# Patient Record
(demographics unavailable — no encounter records)

---

## 2024-10-19 NOTE — PHYSICAL EXAM
[No Acute Distress] : no acute distress [Well Nourished] : well nourished [Well Developed] : well developed [Well-Appearing] : well-appearing [Normal Voice/Communication] : normal voice/communication [Normal Sclera/Conjunctiva] : normal sclera/conjunctiva [PERRL] : pupils equal round and reactive to light [EOMI] : extraocular movements intact [Normal Outer Ear/Nose] : the outer ears and nose were normal in appearance [Normal Oropharynx] : the oropharynx was normal [No JVD] : no jugular venous distention [Supple] : supple [No Respiratory Distress] : no respiratory distress  [No Accessory Muscle Use] : no accessory muscle use [Normal Rate] : normal rate  [Regular Rhythm] : with a regular rhythm [Normal S1, S2] : normal S1 and S2 [No Edema] : there was no peripheral edema [No Extremity Clubbing/Cyanosis] : no extremity clubbing/cyanosis [Declined Breast Exam] : declined breast exam  [Declined Rectal Exam] : declined rectal exam [No CVA Tenderness] : no CVA  tenderness [No Spinal Tenderness] : no spinal tenderness [No Rash] : no rash [No Focal Deficits] : no focal deficits [Normal Gait] : normal gait [Speech Grossly Normal] : speech grossly normal [Normal Affect] : the affect was normal [Alert and Oriented x3] : oriented to person, place, and time [de-identified] : Wearing glasses [de-identified] : No sinus tenderness; diminished hearing [de-identified] : No stridor [de-identified] : RR = 16; normal air entry without wheezing [de-identified] : Normal bilateral radial pulses; no cords [de-identified] : As per GYN

## 2024-10-19 NOTE — HEALTH RISK ASSESSMENT
[No] : In the past 12 months have you used drugs other than those required for medical reasons? No [No falls in past year] : Patient reported no falls in the past year [Patient refused screening] : Patient refused screening [Reviewed no changes] : Reviewed, no changes [Former] : Former [de-identified] : Oncology, endocrinology, podiatry [de-identified] : Exercises [de-identified] : Regular [AdvancecareDate] : 10/24

## 2024-10-19 NOTE — REVIEW OF SYSTEMS
[Vision Problems] : vision problems [Hearing Loss] : hearing loss [Nocturia] : nocturia [Frequency] : frequency [Joint Pain] : joint pain [Muscle Pain] : muscle pain [Back Pain] : back pain [Headache] : headache [Dizziness] : dizziness [Depression] : depression

## 2024-10-19 NOTE — HISTORY OF PRESENT ILLNESS
[Family Member] : family member [FreeTextEntry1] : Comes in for follow-up medical visit. Accompanied by her son David. [de-identified] : She reports that her lower extremity swelling has diminished.  She continues to have lower extremity cramping and pain. She reports normal urination and bowel movements.  She denies any chest pain or abdominal pain.  She denies any palpitations, diaphoresis, nausea/vomiting, shortness of breath.  She denies any cough or hemoptysis. She recently had oncology follow-up for her breast cancer. She also recently saw endocrinology.  She recently saw podiatry.  She is awaiting diabetic shoes. She will be seeing head and neck surgery regarding her hyperparathyroidism.  She also has an upcoming nephrology appointment.

## 2024-10-19 NOTE — ASSESSMENT
[FreeTextEntry1] : Diabetes Likely with neuropathy Likely with nephropathy A1c level of 6%  Saw endocrine Needs diabetes education with home glucose monitoring Needs nutritional counseling Saw podiatry for diabetic footcare = awaiting specialized shoes Will likely need neurology evaluation given progressive neuropathy To see ophthalmology for diabetic eye exam Exercise/weight control ADA diet  Evidence of CKD Likely related to DM and hypertension Planning nephrology evaluation Avoid nephrotoxins especially NSAIDs Keep well-hydrated Will closely monitor creatinine and GFR  History of nonalcoholic fatty liver disease Monitor liver ultrasound and FibroScan Monitor LFTs Plans hepatology evaluation = referrals given Weight control Low-fat diet Control lipids  High cholesterol Lipid profile not at goal Exercise/weight control Strict low-fat diet reviewed with patient Continue daily atorvastatin = dose increased by endocrine Monitor lipid profile  Hypercalcemia with elevated PTH level = hyperparathyroidism Keep well-hydrated Closely monitor calcium level No calcium supplementation Just had abnormal bone density consistent with osteoporosis Saw endocrine and referred to head and neck surgery for further management  Hypertension Blood pressure in better range Continue losartan Continue amlodipine Home blood pressure monitoring Low-sodium diet Exercise/weight control Will need cardiology evaluation  Needed health maintenance reviewed Spoke with Mia via phone = concerned that patient's leg pain and cramping may be due to anastrozole = she will reach out to oncology She will return in follow-up in 6-12 weeks and as needed She will call if her status changes or worsens or for any medical issues and return to be seen immediately All of the above was discussed in detail with them and all questions were answered They verbally confirmed understanding of all of the above and agreement with the above plan

## 2024-10-19 NOTE — HEALTH RISK ASSESSMENT
[No] : In the past 12 months have you used drugs other than those required for medical reasons? No [No falls in past year] : Patient reported no falls in the past year [Patient refused screening] : Patient refused screening [Reviewed no changes] : Reviewed, no changes [Former] : Former [de-identified] : Oncology, endocrinology, podiatry [de-identified] : Exercises [de-identified] : Regular [AdvancecareDate] : 10/24

## 2024-10-19 NOTE — HISTORY OF PRESENT ILLNESS
[Family Member] : family member [FreeTextEntry1] : Comes in for follow-up medical visit. Accompanied by her son David. [de-identified] : She reports that her lower extremity swelling has diminished.  She continues to have lower extremity cramping and pain. She reports normal urination and bowel movements.  She denies any chest pain or abdominal pain.  She denies any palpitations, diaphoresis, nausea/vomiting, shortness of breath.  She denies any cough or hemoptysis. She recently had oncology follow-up for her breast cancer. She also recently saw endocrinology.  She recently saw podiatry.  She is awaiting diabetic shoes. She will be seeing head and neck surgery regarding her hyperparathyroidism.  She also has an upcoming nephrology appointment.

## 2024-10-19 NOTE — PHYSICAL EXAM
[No Acute Distress] : no acute distress [Well Nourished] : well nourished [Well Developed] : well developed [Well-Appearing] : well-appearing [Normal Voice/Communication] : normal voice/communication [Normal Sclera/Conjunctiva] : normal sclera/conjunctiva [PERRL] : pupils equal round and reactive to light [EOMI] : extraocular movements intact [Normal Outer Ear/Nose] : the outer ears and nose were normal in appearance [Normal Oropharynx] : the oropharynx was normal [No JVD] : no jugular venous distention [Supple] : supple [No Respiratory Distress] : no respiratory distress  [No Accessory Muscle Use] : no accessory muscle use [Normal Rate] : normal rate  [Regular Rhythm] : with a regular rhythm [Normal S1, S2] : normal S1 and S2 [No Edema] : there was no peripheral edema [No Extremity Clubbing/Cyanosis] : no extremity clubbing/cyanosis [Declined Breast Exam] : declined breast exam  [Declined Rectal Exam] : declined rectal exam [No CVA Tenderness] : no CVA  tenderness [No Spinal Tenderness] : no spinal tenderness [No Rash] : no rash [No Focal Deficits] : no focal deficits [Normal Gait] : normal gait [Speech Grossly Normal] : speech grossly normal [Normal Affect] : the affect was normal [Alert and Oriented x3] : oriented to person, place, and time [de-identified] : Wearing glasses [de-identified] : No sinus tenderness; diminished hearing [de-identified] : No stridor [de-identified] : RR = 16; normal air entry without wheezing [de-identified] : Normal bilateral radial pulses; no cords [de-identified] : As per GYN

## 2024-10-25 NOTE — PHYSICAL EXAM
[de-identified] : no palpable thyroid nodules [Laryngoscopy Performed] : laryngoscopy was performed, see procedure section for findings [Midline] : located in midline position [Normal] : orientation to person, place, and time: normal [de-identified] : indirect  laryngoscopy shows normal vocal cord mobility bilaterally with no lesions noted

## 2024-10-25 NOTE — CONSULT LETTER
[Dear  ___] : Dear  [unfilled], [Consult Letter:] : I had the pleasure of evaluating your patient, [unfilled]. [Please see my note below.] : Please see my note below. [Consult Closing:] : Thank you very much for allowing me to participate in the care of this patient.  If you have any questions, please do not hesitate to contact me. [Sincerely,] : Sincerely, [FreeTextEntry2] : Dr. Kimber Campo, Dr. Kimberly Oglesby, Dr. Kristi Medina [FreeTextEntry3] : Fabian Harley MD, FACS System Director, Endocrine Surgery [facility] Associate  Professor of Surgery [facility] [employer] at [facility] [DrDarcy  ___] : Dr. RODRIGUEZ [DrDarcy ___] : Dr. RODRIGUEZ

## 2024-10-25 NOTE — HISTORY OF PRESENT ILLNESS
[de-identified] : Pt c/ elevated calcium for several years, bone pain, fatigue and kidney stones. denies dysphagia, hoarseness, SOB or RT exposure.  Ca 10.7, , Vitamin D 44.4, 24 Hr Urine Ca 43, TSH 1.72 sonogram: Left 2.8 cm possible parathyroid, bilateral subcentimeter thyroid nodules bone density: osteoporosis (prior showed osteopenia) I have reviewed all old and new data and available images.  Additional information was obtained from others present at the time of visit to ensure the completeness of the history

## 2024-10-25 NOTE — PHYSICAL EXAM
[de-identified] : no palpable thyroid nodules [Laryngoscopy Performed] : laryngoscopy was performed, see procedure section for findings [Midline] : located in midline position [Normal] : orientation to person, place, and time: normal [de-identified] : indirect  laryngoscopy shows normal vocal cord mobility bilaterally with no lesions noted

## 2024-10-25 NOTE — CONSULT LETTER
[Dear  ___] : Dear  [unfilled], [Consult Letter:] : I had the pleasure of evaluating your patient, [unfilled]. [Please see my note below.] : Please see my note below. [Consult Closing:] : Thank you very much for allowing me to participate in the care of this patient.  If you have any questions, please do not hesitate to contact me. [Sincerely,] : Sincerely, [FreeTextEntry2] : Dr. Kimber Campo, Dr. Kimberly Oglesby, Dr. Kristi Medina [FreeTextEntry3] : Fabian Harley MD, FACS System Director, Endocrine Surgery [facility] Associate  Professor of Surgery [facility] [employer] at [facility] [DrDarcy  ___] : Dr. RODRIGUEZ [DrDarcy ___] : Dr. RODRIGUEZ

## 2024-10-25 NOTE — HISTORY OF PRESENT ILLNESS
[de-identified] : Pt c/ elevated calcium for several years, bone pain, fatigue and kidney stones. denies dysphagia, hoarseness, SOB or RT exposure.  Ca 10.7, , Vitamin D 44.4, 24 Hr Urine Ca 43, TSH 1.72 sonogram: Left 2.8 cm possible parathyroid, bilateral subcentimeter thyroid nodules bone density: osteoporosis (prior showed osteopenia) I have reviewed all old and new data and available images.  Additional information was obtained from others present at the time of visit to ensure the completeness of the history

## 2024-10-25 NOTE — ASSESSMENT
[FreeTextEntry1] : lengthy discussion regarding options for management. in view of labs and symptoms have recommended minimally invasive parathyroidectomy with PTH assay.  will require preop 4D MRI (no CT due to elevate creatinine).  risks, benefits and alternatives discussed at length.  I have discussed with the patient the anatomy of the area, the pathophysiology of the disease process and the rationale for surgery.  The attendant risks, possible complications and expected postoperative course have been discussed in detail.  I have given the patient the opportunity to ask questions, and all questions have been answered to the patient's satisfaction, and they wish to proceed with the planned procedure.  to be scheduled ambulatory at [facility]. to call next week for test results.

## 2024-10-25 NOTE — ASSESSMENT
[FreeTextEntry1] : lengthy discussion regarding options for management. in view of labs and symptoms have recommended minimally invasive parathyroidectomy with PTH assay.  will require preop 4D MRI (no CT due to elevate creatinine).  risks, benefits and alternatives discussed at length.  I have discussed with the patient the anatomy of the area, the pathophysiology of the disease process and the rationale for surgery.  The attendant risks, possible complications and expected postoperative course have been discussed in detail.  I have given the patient the opportunity to ask questions, and all questions have been answered to the patient's satisfaction, and they wish to proceed with the planned procedure.  to be scheduled ambulatory at [facility]. to call next week for test results.

## 2024-10-29 NOTE — HISTORY OF PRESENT ILLNESS
[FreeTextEntry1] : Ms. Sharpe is  a 68 y/o F with Stage I right breast invasive ductal carcinoma, DM, HTN and primary hyperparathyroidism here for evaluation and management of her kidney disease   Diagnosed with stage I right breast invasive ductal carcinoma (T1N0) that is ER positive and Her2 negative s/p lumpectomy and SLNB. She had RT with Dr Go and started on anastrozole 1/2020 to 3/2024 and currently has been on exemestane.   H/O DM: many tears. Well controlled +NEUROPATHY, ?retinopathy  HTN: Many years Occasionally measures BP at home: usually in the 150-160 millimeters mercury Recently started on amlodipine  Hyperparathyroidism:  untreated for many years +osteoporosis, + kidney stones Had a 24 hr urine collection: calcium was not elevated Follows with an endocrinologist , now scheduled for surgery in mid November  Records indicate that her serum creatinine in 2021 and 2022 was ranging between 1.2 to 1.3 mg/dL.  No blood work is available in 2023 this year noted to have a creatinine of 1.6 mg/dL when checked last month.  Also noted to have elevated serum calcium ranging between 10.5 -11 mg/dl  over the last couple of years She complains of body weeks and does take Motrin on an as-needed basis which is very rare probably once a month or so

## 2024-10-29 NOTE — ASSESSMENT
[FreeTextEntry1] : Ms. Sharpe is a 69-year-old woman with history of diabetes, hypertension, newly diagnosed hyperparathyroidism here for evaluation and management of her chronic kidney disease  CKD: stage 3. Records indicate that her serum creatinine in 2021 and 2022 was ranging between 1.2 to 1.3 mg/dL.  No blood work is available in 2023 this year noted to have a creatinine of 1.6 mg/dL when checked last month.  This is likely in the setting of longstanding history of diabetes and hypertension.  Her kidney function has worsened over the last year or so possibly the hyperparathyroidism with hypercalcemia may also be playing a role here. She does have evidence of neuropathy and microalbuminuria.  Unclear if she has retinopathy or not Currently her sugar is under control. Reinforced adequate hydration Her use of NSAIDs is very sparse. At some point will start SGLT2 inhibitors however currently her blood pressure needs to be better controlled  HTN: Her blood pressure is uncontrolled, we discussed low-salt diet Maintain on losartan.  Will start Karedia Keep amlodipine for now  Hyperparathyroidism: History of kidney stones as well as osteoporosis and hypercalcemia per daughter-in-law this is longstanding.  Her 24-hour urine collection is not high which is possibly the only parameter not consistent with hyperparathyroidism.  It is quite possible this was an undercollection.  She is scheduled for an MRI of parathyroid.  Further management per endocrine and ENT surgeon  F/U in 3 months  Total Time Spent today on encounter 45 minutes. >50% time spent in counseling and coordination of care and on addressing above medical conditions in assessment. All labs, imaging, consulting reports, and any relevant outside records including laboratory work personally reviewed in order to evaluate, manage, and coordinate care amongst providers

## 2024-10-29 NOTE — REVIEW OF SYSTEMS
[Feeling Tired] : feeling tired [Eyesight Problems] : eyesight problems [Chest Pain] : chest pain [Lower Ext Edema] : lower extremity edema [Arthralgias] : arthralgias [Joint Stiffness] : joint stiffness [Difficulty Walking] : difficulty walking [Sleep Disturbances] : sleep disturbances [Anxiety] : anxiety [Depression] : depression [Feelings Of Weakness] : feelings of weakness [Negative] : Integumentary [Fever] : no fever [Chills] : no chills [Recent Weight Gain (___ Lbs)] : no recent weight gain [Recent Weight Loss (___ Lbs)] : no recent weight loss [Leg Claudication] : no intermittent leg claudication [Dysuria] : no dysuria [Incontinence] : no incontinence [Pelvic Pain] : no pelvic pain [Dysmenorrhea] : no dysmenorrhea [Limb Pain] : no limb pain [Limb Swelling] : no limb swelling [Skin Wound] : no skin wound [Itching] : no itching [Change In A Mole] : no change in a mole [Dizziness] : no dizziness [Fainting] : no fainting [Limb Weakness] : no limb weakness [Easy Bleeding] : no tendency for easy bleeding [Easy Bruising] : no tendency for easy bruising [FreeTextEntry3] : wears glasses [FreeTextEntry5] : +rt chest wall pain due to rib resection [FreeTextEntry8] : +nocturia

## 2024-10-29 NOTE — PHYSICAL EXAM
[General Appearance - Alert] : alert [General Appearance - In No Acute Distress] : in no acute distress [Sclera] : the sclera and conjunctiva were normal [PERRL With Normal Accommodation] : pupils were equal in size, round, and reactive to light [FreeTextEntry1] : obese [Outer Ear] : the ears and nose were normal in appearance [Examination Of The Oral Cavity] : the lips and gums were normal [Oropharynx] : the oropharynx was normal [Neck Appearance] : the appearance of the neck was normal [Jugular Venous Distention Increased] : there was no jugular-venous distention [Respiration, Rhythm And Depth] : normal respiratory rhythm and effort [Exaggerated Use Of Accessory Muscles For Inspiration] : no accessory muscle use [Auscultation Breath Sounds / Voice Sounds] : lungs were clear to auscultation bilaterally [Heart Sounds] : normal S1 and S2 [Murmurs] : no murmurs [Heart Sounds Pericardial Friction Rub] : no pericardial rub [Edema] : there was no peripheral edema [Bowel Sounds] : normal bowel sounds [Abdomen Soft] : soft [Abdomen Tenderness] : non-tender [Abdomen Hernia] : no hernia was discovered [No CVA Tenderness] : no ~M costovertebral angle tenderness [Involuntary Movements] : no involuntary movements were seen [] : no rash [No Focal Deficits] : no focal deficits [Oriented To Time, Place, And Person] : oriented to person, place, and time [Impaired Insight] : insight and judgment were intact [Affect] : the affect was normal

## 2024-10-30 NOTE — PHYSICAL EXAM
[No Acute Distress] : no acute distress [Well Nourished] : well nourished [Well Developed] : well developed [Well-Appearing] : well-appearing [Normal Voice/Communication] : normal voice/communication [Normal Sclera/Conjunctiva] : normal sclera/conjunctiva [PERRL] : pupils equal round and reactive to light [EOMI] : extraocular movements intact [Normal Outer Ear/Nose] : the outer ears and nose were normal in appearance [Normal Oropharynx] : the oropharynx was normal [No JVD] : no jugular venous distention [Supple] : supple [No Respiratory Distress] : no respiratory distress  [No Accessory Muscle Use] : no accessory muscle use [Clear to Auscultation] : lungs were clear to auscultation bilaterally [Normal Rate] : normal rate  [Regular Rhythm] : with a regular rhythm [Normal S1, S2] : normal S1 and S2 [No Edema] : there was no peripheral edema [No Extremity Clubbing/Cyanosis] : no extremity clubbing/cyanosis [Declined Breast Exam] : declined breast exam  [Soft] : abdomen soft [Normal Bowel Sounds] : normal bowel sounds [Declined Rectal Exam] : declined rectal exam [No CVA Tenderness] : no CVA  tenderness [No Spinal Tenderness] : no spinal tenderness [No Rash] : no rash [No Focal Deficits] : no focal deficits [Normal Gait] : normal gait [Speech Grossly Normal] : speech grossly normal [Normal Affect] : the affect was normal [Alert and Oriented x3] : oriented to person, place, and time [de-identified] : No sinus tenderness; diminished hearing [de-identified] : No stridor [de-identified] : RR = 16; normal air entry without wheezing [de-identified] : Normal bilateral radial pulses; no cords [de-identified] : As per GYN

## 2024-10-30 NOTE — HISTORY OF PRESENT ILLNESS
[Chronic Kidney Disease] : chronic kidney disease [Diabetes] : diabetes [No Pertinent Cardiac History] : no history of aortic stenosis, atrial fibrillation, coronary artery disease, recent myocardial infarction, or implantable device/pacemaker [Aortic Stenosis] : no aortic stenosis [Atrial Fibrillation] : no atrial fibrillation [Coronary Artery Disease] : no coronary artery disease [Recent Myocardial Infarction] : no recent myocardial infarction [Implantable Device/Pacemaker] : no implantable device/pacemaker [Asthma] : asthma [COPD] : no COPD [Sleep Apnea] : no sleep apnea [Smoker] : not a smoker [No Adverse Anesthesia Reaction] : no adverse anesthesia reaction in self or family member [Self] : no previous adverse anesthesia reaction [Chronic Anticoagulation] : no chronic anticoagulation [(Patient denies any chest pain, claudication, dyspnea on exertion, orthopnea, palpitations or syncope)] : Patient denies any chest pain, claudication, dyspnea on exertion, orthopnea, palpitations or syncope [Unable to assess] : unable to assess [Anti-Platelet Agents: _____] : Anti-Platelet Agents: [unfilled] [FreeTextEntry1] : parathyroid surgery [FreeTextEntry2] : 11/15/24 [FreeTextEntry3] : Dr. Harley [FreeTextEntry4] : Comes in for medical clearance for upcoming planned parathyroid surgery. [FreeTextEntry7] : EKG [Family Member] : family member

## 2024-10-30 NOTE — ASSESSMENT
[Patient Optimized for Surgery] : Patient optimized for surgery [Modify anti-platelet treatment prior to procedure] : Modify anti-platelet treatment prior to procedure [Continue medications as is] : Continue current medications [As per surgery] : as per surgery [FreeTextEntry4] : Medically cleared for planned parathyroid surgery. [FreeTextEntry6] : Hold aspirin 1 week prior to surgery

## 2024-10-30 NOTE — PHYSICAL EXAM
[No Acute Distress] : no acute distress [Well Nourished] : well nourished [Well Developed] : well developed [Well-Appearing] : well-appearing [Normal Voice/Communication] : normal voice/communication [Normal Sclera/Conjunctiva] : normal sclera/conjunctiva [PERRL] : pupils equal round and reactive to light [EOMI] : extraocular movements intact [Normal Outer Ear/Nose] : the outer ears and nose were normal in appearance [Normal Oropharynx] : the oropharynx was normal [No JVD] : no jugular venous distention [Supple] : supple [No Respiratory Distress] : no respiratory distress  [No Accessory Muscle Use] : no accessory muscle use [Clear to Auscultation] : lungs were clear to auscultation bilaterally [Normal Rate] : normal rate  [Regular Rhythm] : with a regular rhythm [Normal S1, S2] : normal S1 and S2 [No Edema] : there was no peripheral edema [No Extremity Clubbing/Cyanosis] : no extremity clubbing/cyanosis [Declined Breast Exam] : declined breast exam  [Soft] : abdomen soft [Normal Bowel Sounds] : normal bowel sounds [Declined Rectal Exam] : declined rectal exam [No CVA Tenderness] : no CVA  tenderness [No Spinal Tenderness] : no spinal tenderness [No Rash] : no rash [No Focal Deficits] : no focal deficits [Normal Gait] : normal gait [Speech Grossly Normal] : speech grossly normal [Normal Affect] : the affect was normal [Alert and Oriented x3] : oriented to person, place, and time [de-identified] : No sinus tenderness; diminished hearing [de-identified] : No stridor [de-identified] : RR = 16; normal air entry without wheezing [de-identified] : Normal bilateral radial pulses; no cords [de-identified] : As per GYN

## 2024-11-26 NOTE — PHYSICAL EXAM
[de-identified] : well healed scar [Midline] : located in midline position [Normal] : orientation to person, place, and time: normal [de-identified] : Neg Chvosteks' sign

## 2024-11-26 NOTE — ASSESSMENT
[FreeTextEntry1] : s/p parathyroidectomy parotid nodule path discussed labs in office Parotid FNA requested f/u 3 months

## 2024-11-26 NOTE — HISTORY OF PRESENT ILLNESS
[de-identified] : Pt 2 weeks s/p parathyroidectomy doing well without complaints on preop scan parotid nodules found will request FNA

## 2024-12-16 NOTE — REVIEW OF SYSTEMS
[Vision Problems] : vision problems [Hearing Loss] : hearing loss [Nocturia] : nocturia [Frequency] : frequency [Joint Pain] : joint pain [Back Pain] : back pain [Headache] : headache [Dizziness] : dizziness [Depression] : depression

## 2024-12-16 NOTE — PHYSICAL EXAM
[No Acute Distress] : no acute distress [Well Nourished] : well nourished [Well Developed] : well developed [Well-Appearing] : well-appearing [Normal Voice/Communication] : normal voice/communication [Normal Sclera/Conjunctiva] : normal sclera/conjunctiva [PERRL] : pupils equal round and reactive to light [EOMI] : extraocular movements intact [Normal Outer Ear/Nose] : the outer ears and nose were normal in appearance [Normal Oropharynx] : the oropharynx was normal [No JVD] : no jugular venous distention [Supple] : supple [No Respiratory Distress] : no respiratory distress  [No Accessory Muscle Use] : no accessory muscle use [Clear to Auscultation] : lungs were clear to auscultation bilaterally [Normal Rate] : normal rate  [Regular Rhythm] : with a regular rhythm [Normal S1, S2] : normal S1 and S2 [No Edema] : there was no peripheral edema [No Extremity Clubbing/Cyanosis] : no extremity clubbing/cyanosis [Declined Breast Exam] : declined breast exam  [Soft] : abdomen soft [Normal Bowel Sounds] : normal bowel sounds [Declined Rectal Exam] : declined rectal exam [No CVA Tenderness] : no CVA  tenderness [No Spinal Tenderness] : no spinal tenderness [No Rash] : no rash [No Focal Deficits] : no focal deficits [Normal Gait] : normal gait [Speech Grossly Normal] : speech grossly normal [Normal Affect] : the affect was normal [Alert and Oriented x3] : oriented to person, place, and time [de-identified] : No sinus tenderness; diminished hearing [de-identified] : No stridor [de-identified] : RR = 16; normal air entry without wheezing [de-identified] : Normal bilateral radial pulses; no cords [de-identified] : As per GYN

## 2024-12-16 NOTE — HEALTH RISK ASSESSMENT
[Intercurrent hospitalizations] : was admitted to the hospital  [No] : In the past 12 months have you used drugs other than those required for medical reasons? No [No falls in past year] : Patient reported no falls in the past year [Patient refused screening] : Patient refused screening [de-identified] : Head and neck surgery [de-identified] : None [de-identified] : Regular [Reviewed no changes] : Reviewed, no changes [AdvancecareDate] : 12/24 [Former] : Former

## 2024-12-16 NOTE — HISTORY OF PRESENT ILLNESS
[FreeTextEntry1] : Comes in for routine follow-up medical visit. [de-identified] : Status post parathyroidectomy.  Had biopsy of left parotid lesion. Leg cramping has improved.  Edema has resolved.  Denies any orthopnea or PND or calf pain.  Reports normal BMs and urination.  Still complains of nocturia.  Denies any abdominal pain or nausea/vomiting/diarrhea.  Status post colonoscopy.  Denies any chest pain, palpitations, diaphoresis, or shortness of breath.  Denies any cough or hemoptysis.  Denies any fevers or chills.  Overall feeling improved.

## 2024-12-16 NOTE — ASSESSMENT
[FreeTextEntry1] : Diabetes Likely with neuropathy Likely with nephropathy Last A1c level = 6%  Saw endocrine Needs diabetes education with home glucose monitoring Needs nutritional counseling Saw podiatry for diabetic footcare = awaiting specialized shoes Will likely need neurology evaluation given progressive neuropathy To see ophthalmology for diabetic eye exam Exercise/weight control ADA diet  Evidence of CKD Likely related to DM and hypertension Under the care of nephrology Avoid nephrotoxins especially NSAIDs Keep well-hydrated Will closely monitor creatinine and GFR  History of nonalcoholic fatty liver disease Monitor liver ultrasound and FibroScan Monitor LFTs Plans hepatology evaluation = referrals given Weight control Low-fat diet Control lipids  High cholesterol Lipid profile not at goal Exercise/weight control Strict low-fat diet reviewed with patient Continue daily atorvastatin = dose increased by endocrine Monitor lipid profile  Hypercalcemia with elevated PTH level = hyperparathyroidism///osteoporosis Status post surgery Closely monitor calcium level Follow-up with Dr. Harley Follow-up with endocrine  Hypertension Blood pressure in better range Continue present regimen as per nephrology Home blood pressure monitoring Low-sodium diet Exercise/weight control Cardiology follow-up  Leg cramping Improved with change of anastrozole to exemestane Oncology follow-up  Needed health maintenance reviewed Can follow through with urology regarding urinary frequency and nocturia She will return in follow-up in 12 weeks and as needed She will call if her status changes or worsens or for any medical issues and return to be seen immediately All of the above was discussed in detail with the patient and her son and all questions were answered They verbally confirmed understanding of all of the above and agreement with the above plan

## 2025-01-09 NOTE — HISTORY OF PRESENT ILLNESS
[FreeTextEntry1] : 69 yr old female with past medical history remarkable for CKD stage 3, asthma, HTN, MASLD, breast cancer on anastrozole presenting for follow up of type 2 diabetes mellitus, primary hyperparathyroidism, and osteoporosis.  PSH: Rib resection surgery many years ago in 2014, left superior parathyroidectomy 11/2024  Regarding hx of breast cancer: - Hx of right invasive duct carcinoma ER - Underwent bilateral lumpectomies 12/2019 and completed adjuvant radiation therapy 4/2020. On anastrozole 1/2020 and then switched to exemestane due to neuropathy in 2/2024  Interval hx: Patient underwent left parathyroidectomy on 11/8/24; she had met criteria for surgery on basis of osteoporosis, renal calculi on renal US, and GFR<60 Pathology showed hypercellular parathyroid- features compatible with adenoma She underwent FNA of parotid nodule, showing simple salivary cyst Recent postoperative labs:  12/16/24 PTH 83 and Ca 9.6 11/26/24 PTH 81 and Ca 9.1  Now after surgery, symptom screening: - She is feeling much better overall - Denies stomach aches, denies constipation - Admits to mild fatigue - She is still urinating 2-3 times overnight No hx of nephrolithiasis (however renal calculi noted on US) and no hx of fracture +Hx of CKD stage 3 and following with nephrology  Osteoporosis: - BMD from 9/4/24: Spine T score -0.7, Femoral Neck T score -1.6, Total hip T score -0.2, Radius 1/3 T score -2.5 - Family hx of hypercalcemia or osteoporosis: denies - Ca through diet: eats green leafy vegetables through diet, drinks milk  - Vitamin D supplement: not taking - Exercise: not exercising much, has fear of falling  Regarding type 2 diabetes history: Diagnosed: ~2021 A1c 6% (12/2024) A1c 6% (9/2024) Current regimen: no meds, diet controlled Glycemic monitoring: not checking, advised her to obtain glucometer (had sent at last visit) and check 1-2 times per week Hypoglycemia symptoms/events: denies  Diet: healthy balanced- likes fruits, yogurt Drinks water majority of the day Avoids juice/soda  Family history of DM in father's side Denies personal hx of pancreatitis or family hx of medullary thyroid cancer/MEN  Diabetes complications and comorbidities: Retinopathy- denies, last ophthalmology exam last year, due to see Neuropathy- a lot of cramps in the evening, pins/needles sensation, following with podiatry Nephropathy- yes Last GFR: 41 on 12/16/24 Last Urine microalbumin: elevated UACr of 135 mg/g on 12/16/24, on losartan 100 mg daily and kerendia (started by nephrology)  HTN- amlodipine 5 mg and losartan 100 mg daily Coronary artery disease- denies CVA- denies Dyslipidemia- on atorvastatin 40 mg daily Last lipid panel from 12-16-24: , chol 153, HDL 41, LDL 66, non-  Social: Tobacco use- denies ETOH- denies

## 2025-01-09 NOTE — DATA REVIEWED
[FreeTextEntry1] : Hx of elevated Calcium levels noted dating back to labs from 12/2021 Ca 11.2 in 12/2021 Ca 10.7 with albumin 4.1, corrected Ca 10.6  and  on 9/5/24 25-OH vit D level of 44.4 on 9/5/24 and 1,25 OH vit D level of 58.1- wnl on 9/5/24 TSH 1.72- 9/2024 8/2024 24 hr urine Ca collection 24 hr Urine Cr 505 mg/24 hr (-1800 mg/24 hr) Urine Cr 101 mg/dL Serum Cr 1.61 mg/dL Serum Ca 10.4 mg/dL Urine Ca 8.5 mg/dL 24 hr Urine Ca 43 mg/24 hr Urine Ca to Cr ratio: 0.013  Renal US 10-10-24: IMPRESSION: Multiple right renal calculi measuring to 7 mm without associated hydronephrosis. Subcentimeter left renal cyst.  Thyroid US 10-10-24: FINDINGS: Right Lobe: 5.2 cm x 1.8 cm x 1.3 cm. Several subcentimeter nodules without suspicious features, largest upper pole solid nodule 8 x 5 x 6 mm Left Lobe: 3.6 cm x 1.4 cm x 1.3 cm. Several nodules less than 0.5 cm without suspicious features Echo poor nodule at posterior lateral aspect lower pole 2.8 x 1.7 x 2.6 cm questionably extending medially. Recommend correlation with CT neck Isthmus: 4 mm. Cervical Lymph Nodes: No enlarged or abnormal morphology cervical nodes. IMPRESSION: Several subcentimeter thyroid nodules without suspicious features. Echo poor nodule identified at lateral aspect lower pole left lobe suggesting possible parathyroid nodule in atypical location. Recommend correlation with CTA.  MRI Neck 11-4-24: IMPRESSION:  Parathyroid adenoma abutting the posterior margin of the left thyroid upper pole. Two well marginated cystic-appearing foci within the left parotid gland. While these may represent parotid cysts, the possibility of a multifocal cystic neoplasm is not excluded. Continued follow-up is advised Multiple pulmonary nodules for which chest CT is advised to confirm stability.  Post-parathyroidectomy labs:  12/16/24 PTH 83 and Ca 9.6 11/26/24 PTH 81 and Ca 9.1  12-16-24: 25-OH vit D 30.7 wnl, TSH 1.44 wnl 12-16-24: , chol 153, HDL 41, LDL 66, non-

## 2025-01-09 NOTE — ASSESSMENT
[FreeTextEntry1] : 69 yr old female with past medical history remarkable for CKD stage 3, asthma, HTN, MASLD, breast cancer presenting for follow up for type 2 diabetes mellitus, primary hyperparathyroidism now s/p parathyroidectomy (11/2024), and osteoporosis  #Primary hyperparathyroidism s/p parathyroidectomy (11/2024) Plan: - repeat PTH and Ca level today  #Thyroid nodules Thyroid US from 10-10-24 showed multiple subcentimeter nodules as well as Echo poor nodule identified at lateral aspect lower pole left lobe suggesting possible parathyroid nodule in atypical location. MRI neck on 11-4-24 correlated to left lower pole parathyroid adenoma Plan: - continue to monitor, will plan to repeat thyroid US in 10/2025  #Osteoporosis Likely due to primary hyperparathyroidism; other risk factors include hx of breast cancer w/anastrozole tx, postmenopausal, age BMD from 9/2024 with Spine T score -0.7, Femoral Neck T score -1.6, Total hip T score -0.2, Radius 1/3 T score -2.5 Now s/p parathyroidectomy, generally see improvement in BMD with effect in ~12-18 months after surgery Plan: - Would avoid bisphosphonates due to patient's reduced GFR - Would avoid PTHr analogs due to hx of radiation tx for breast cancer and hyperparathyroidism hx - Can consider starting tx with prolia in the future however will first re-evaluate as generally see improvement in BMD with effect in ~12-18 months after surgery - Plan to repeat BMD in 9/2026 - Optimize ca intake through dietary sources, goal of 1200 mg daily - Optimize vitamin D intake- recommend 1000 IU daily - Recommend daily weight bearing exercise- 20-30 minutes goal of walking daily  #T2DM with moderately increased albuminuria - Well controlled with recent 12/2024 HbA1c 6%, off medications - Continue lifestyle modifications  BG monitoring: advised patient to check BG 1-2 times per week with glucometer Discussed BG targets below: Target HbA1c <7% Pre-prandial target  mg/dL Post-prandial target <180 mg/dL  Lifestyle modifications: Recommend increasing weekly physical activity with goal 150 minutes/week and dietary modifications- limiting sugary beverages and fried foods, incorporating more fruits/vegetables and whole grains in diet   Monitoring for complications: - Ophthalmology: recommend ophthalmology follow up - Podiatry: following with podiatry - Renal: last urine microalbumin elevated UACr 158 in 12/2024, on losartan 100 mg daily and kerendia, following with nephrology  #Essential HTN  - Continue amlodipine 5 mg and losartan 100 mg daily  #HLD with LDL goal <70 Recent lipid panel 12-16-24: , chol 153, HDL 41, LDL 66, non- Plan: - Continue atorvastatin to 40 mg daily  - Counseled on lifestyle modifications  #Obesity BMI 32 - counseled on lifestyle modifications  RTC in 6 months

## 2025-02-20 NOTE — REVIEW OF SYSTEMS
[Eyesight Problems] : eyesight problems [Lower Ext Edema] : lower extremity edema [Arthralgias] : arthralgias [Joint Stiffness] : joint stiffness [Difficulty Walking] : difficulty walking [Sleep Disturbances] : sleep disturbances [Anxiety] : anxiety [Depression] : depression [Feelings Of Weakness] : feelings of weakness [Negative] : Integumentary [Fever] : no fever [Chills] : no chills [Feeling Tired] : not feeling tired [Recent Weight Gain (___ Lbs)] : no recent weight gain [Recent Weight Loss (___ Lbs)] : no recent weight loss [Chest Pain] : no chest pain [Leg Claudication] : no intermittent leg claudication [Dysuria] : no dysuria [Incontinence] : no incontinence [Pelvic Pain] : no pelvic pain [Dysmenorrhea] : no dysmenorrhea [Limb Pain] : no limb pain [Limb Swelling] : no limb swelling [Skin Wound] : no skin wound [Itching] : no itching [Change In A Mole] : no change in a mole [Dizziness] : no dizziness [Fainting] : no fainting [Limb Weakness] : no limb weakness [Easy Bleeding] : no tendency for easy bleeding [Easy Bruising] : no tendency for easy bruising [FreeTextEntry3] : wears glasses [FreeTextEntry8] : +nocturia

## 2025-02-20 NOTE — HISTORY OF PRESENT ILLNESS
[de-identified] : Pt 3 months s/p parathyroidectomy doing well without complaints recent labs shows normal Calcium with low vitamin D and elevated PTH Pt was recently started on vitamin D supplements

## 2025-02-20 NOTE — HISTORY OF PRESENT ILLNESS
[FreeTextEntry1] : Ms. Sharpe is  a 68 y/o F with Stage I right breast invasive ductal carcinoma, DM, HTN and primary hyperparathyroidism here for evaluation and management of her kidney disease   Diagnosed with stage I right breast invasive ductal carcinoma (T1N0) that is ER positive and Her2 negative s/p lumpectomy and SLNB. She had RT with Dr Go and started on anastrozole 1/2020 to 3/2024 and currently has been on exemestane.   H/O DM: many tears. Well controlled +NEUROPATHY, ?retinopathy  HTN: Many years Occasionally measures BP at home: usually in the 150-160 millimeters mercury Recently started on amlodipine  Hyperparathyroidism:  untreated for many years +osteoporosis, + kidney stones Had a 24 hr urine collection: calcium was not elevated Follows with an endocrinologist , now scheduled for surgery in mid November  Records indicate that her serum creatinine in 2021 and 2022 was ranging between 1.2 to 1.3 mg/dL.  No blood work is available in 2023 this year noted to have a creatinine of 1.6 mg/dL when checked last month.  Also noted to have elevated serum calcium ranging between 10.5 -11 mg/dl  over the last couple of years She complains of body weeks and does take Motrin on an as-needed basis which is very rare probably once a month or so   February 2025 : She comes for a follow-up visit.  She underwent a parathyroidectomy 2 months ago.  Overall feels fair.  States her blood pressure is well-controlled but she can no longer afford Kerendia.  She is requesting change in BP medications k

## 2025-02-20 NOTE — PHYSICAL EXAM
[de-identified] : well healed scar [Midline] : located in midline position [Normal] : orientation to person, place, and time: normal [de-identified] : Neg Chvosteks' sign

## 2025-02-20 NOTE — ASSESSMENT
[FreeTextEntry1] : Ms. Sharpe is a 69-year-old woman with history of diabetes, hypertension, newly diagnosed hyperparathyroidism here for evaluation and management of her chronic kidney disease  CKD: stage 3. Records indicate that her serum creatinine in 2021 and 2022 was ranging between 1.2 to 1.3 mg/dL.  No blood work is available in 2023 this year noted to have a creatinine of 1.6 mg/dL when checked September 2024. This is likely in the setting of longstanding history of diabetes and hypertension. She does have evidence of neuropathy and microalbuminuria. Questionable role of   hyperparathyroidism with hypercalcemia .   Kidney function is stable with serum creatinine of 1.6 mg/dL electrolytes in range.  Albuminuria improving Calcium is now in the normal range since her parathyroidectomy Reinforced adequate hydration Her use of NSAIDs is very sparse.  Reinforced that she take no NSAIDs At some point will start SGLT2 inhibitors    HTN: Her blood pressure is improved but she can no longer afford Kerendia  Discussed low-salt diet Maintain on losartan.   Increase amlodipine to 10 mg/day Stop Kerendia Told her to keep a BP log and call me if she develops leg edema and or uncontrolled blood pressure   Hyperparathyroidism: With history of kidney stones (multiple calculi measuring up to 7 mm in the right kidney) S/p parathyroidectomy  F/U in 3 months

## 2025-03-04 NOTE — ASSESSMENT
[FreeTextEntry1] : She is a 68 y/o F with Stage I right breast invasive ductal carcinoma (T1N0) that is ER positive and Her2 negative s/p lumpectomy and SLNB. She had RT with Dr Go and started on anastrozole 1/2020 to 3/2024 and currently has been on exemestane.   -discontinue exemestane. She completed 5 years of hormone therapy and has symptoms of myalgias/arthralgias on AI  - We reviewed signs and symptoms of breast cancer recurrence. No new signs or symptoms of recurrence.  -Mammogram/sonogram done 3/2025 w/o any evidence of malignancy. Next mammo/sono due in 2/2026 - Reviewed her insomnia which has been chronic and also may be contributing to fatigue.  -urology referral for urinary frequency. Cancer navigator will help to schedule an appointment. -Bone health: last DEXA 2024: osteopenia in the spine and femoral neck: stable to improved but normal in the hip and osteoporosis in the wrist which was not previously imaged. Continue w/ calcium and Vitamin D supplementation along with exercise to maintain bone health.  Next follow up in 1 year but earlier if any new symptoms.

## 2025-03-04 NOTE — END OF VISIT
[Time Spent: ___ minutes] : I have spent [unfilled] minutes of time on the encounter which excludes teaching and separately reported services. [] : Fellow [FreeTextEntry3] : Has been having arthralgias along with potential neuropathy in BLE that is worse on endocrine therapy. Also with leg cramps despite magnesium supplement. Will monitor off endocrine therapy and feel that arthralgias and leg cramps will improve. She will continue with supportive measures. She has not been able to hydrate well due to urinary frequency that is worse at night and waking her up every 2 hours. She wanted recommendations for urologist and we referred her to [facility] for urology evaluation.

## 2025-03-04 NOTE — END OF VISIT
[Time Spent: ___ minutes] : I have spent [unfilled] minutes of time on the encounter which excludes teaching and separately reported services. [] : Fellow [FreeTextEntry3] : Has been having arthralgias along with potential neuropathy in BLE that is worse on endocrine therapy. Also with leg cramps despite magnesium supplement. Will monitor off endocrine therapy and feel that arthralgias and leg cramps will improve. She will continue with supportive measures. She has not been able to hydrate well due to urinary frequency that is worse at night and waking her up every 2 hours. She wanted recommendations for urologist and we referred her to [facility] for urology evaluation.

## 2025-03-04 NOTE — HISTORY OF PRESENT ILLNESS
[Disease: _____________________] : Disease: [unfilled] [T: ___] : T[unfilled] [N: ___] : N[unfilled] [AJCC Stage: ____] : AJCC Stage: [unfilled] [de-identified] : Age 65: right breast cancer Screen detected: 08/2019 on routine bilateral screening mammography, she was found to have heterogeneously dense breasts, and sonography and breast ultrasound was recommended. In 11/2019, she underwent bilateral breast ultrasounds which noted a suspicious lesion at the 8 o'clock axis for which biopsy was recommended. An ultrasound-guided biopsy was performed showing evidence of invasive duct carcinoma spanning at least 9 mm, estrogen receptor positive (greater than 95%) and HER-2/valencia negative including a FISH which returned non-amplified. She ultimately went on to have bilateral lumpectomies performed on 12/19/2019 with a finding in the right breast of invasive duct carcinoma measuring 1.6 cm, grade 2, with 0/2 right axillary sentinel lymph nodes involved with carcinoma and 0/1 non-sentinel lymph node involved with carcinoma; in the left breast, she was found to have invasive duct carcinoma measuring 0.2 cm, grade 2, with 0/1 sentinel lymph node involved with carcinoma, estrogen receptor positive, progesterone receptor positive, and HER-2/valencia negative. She had adjuvant radiation therapy completed in 04/2020 by Dr. Sarah Go. Her right breast tumor had an Oncotype DX assay performed which returned with a low-risk score, indicating a 3% risk of distant recurrence within 9 years should she take tamoxifen or an aromatase inhibitor. She was on adjuvant anastrozole in 01/2020 with Dr Waller. She then developed continuity of care with Dr Beaulieu in 2021. She started continuity of care on 3/29/2024. Switched to exemestane due to neuropathy sensation.   [de-identified] :  right breast of invasive duct carcinoma ER 95%, Her2 negative  [de-identified] : anastrozole 1/2020 to 2/2024 exemestane 2/2024 to present  [de-identified] : Is on exemestane. She has arthritis over the knees: unchanged. Has myalgias. Denies any new breast pain or chest wall changes. No back pain, cough or HA.  Feels very tired since not sleeping enough for years as she has to wake up every 2 hours to urinate and also taking care of 3 grandsons. Able to do all her activities and housework. Mammogram/sonogram done 3/2025 w/o any evidence of malignancy

## 2025-03-04 NOTE — REVIEW OF SYSTEMS
[Fatigue] : fatigue [Diarrhea: Grade 0] : Diarrhea: Grade 0 [Joint Pain] : joint pain [Insomnia] : insomnia [Negative] : Allergic/Immunologic [Fever] : no fever [Chills] : no chills [Night Sweats] : no night sweats [Recent Change In Weight] : ~T no recent weight change [Joint Stiffness] : no joint stiffness [Muscle Pain] : no muscle pain [Muscle Weakness] : no muscle weakness [Suicidal] : not suicidal [Anxiety] : no anxiety [Depression] : no depression [FreeTextEntry8] : urinary frequency [FreeTextEntry9] : arthritis over the knees

## 2025-03-04 NOTE — ASSESSMENT
[FreeTextEntry1] : She is a 70 y/o F with Stage I right breast invasive ductal carcinoma (T1N0) that is ER positive and Her2 negative s/p lumpectomy and SLNB. She had RT with Dr Go and started on anastrozole 1/2020 to 3/2024 and currently has been on exemestane.   -discontinue exemestane. She completed 5 years of hormone therapy and has symptoms of myalgias/arthralgias on AI  - We reviewed signs and symptoms of breast cancer recurrence. No new signs or symptoms of recurrence.  -Mammogram/sonogram done 3/2025 w/o any evidence of malignancy. Next mammo/sono due in 2/2026 - Reviewed her insomnia which has been chronic and also may be contributing to fatigue.  -urology referral for urinary frequency. Cancer navigator will help to schedule an appointment. -Bone health: last DEXA 2024: osteopenia in the spine and femoral neck: stable to improved but normal in the hip and osteoporosis in the wrist which was not previously imaged. Continue w/ calcium and Vitamin D supplementation along with exercise to maintain bone health.  Next follow up in 1 year but earlier if any new symptoms.

## 2025-03-04 NOTE — HISTORY OF PRESENT ILLNESS
[Disease: _____________________] : Disease: [unfilled] [T: ___] : T[unfilled] [N: ___] : N[unfilled] [AJCC Stage: ____] : AJCC Stage: [unfilled] [de-identified] : Age 65: right breast cancer Screen detected: 08/2019 on routine bilateral screening mammography, she was found to have heterogeneously dense breasts, and sonography and breast ultrasound was recommended. In 11/2019, she underwent bilateral breast ultrasounds which noted a suspicious lesion at the 8 o'clock axis for which biopsy was recommended. An ultrasound-guided biopsy was performed showing evidence of invasive duct carcinoma spanning at least 9 mm, estrogen receptor positive (greater than 95%) and HER-2/valencia negative including a FISH which returned non-amplified. She ultimately went on to have bilateral lumpectomies performed on 12/19/2019 with a finding in the right breast of invasive duct carcinoma measuring 1.6 cm, grade 2, with 0/2 right axillary sentinel lymph nodes involved with carcinoma and 0/1 non-sentinel lymph node involved with carcinoma; in the left breast, she was found to have invasive duct carcinoma measuring 0.2 cm, grade 2, with 0/1 sentinel lymph node involved with carcinoma, estrogen receptor positive, progesterone receptor positive, and HER-2/valencia negative. She had adjuvant radiation therapy completed in 04/2020 by Dr. Sarah Go. Her right breast tumor had an Oncotype DX assay performed which returned with a low-risk score, indicating a 3% risk of distant recurrence within 9 years should she take tamoxifen or an aromatase inhibitor. She was on adjuvant anastrozole in 01/2020 with Dr Waller. She then developed continuity of care with Dr Beaulieu in 2021. She started continuity of care on 3/29/2024. Switched to exemestane due to neuropathy sensation.   [de-identified] :  right breast of invasive duct carcinoma ER 95%, Her2 negative  [de-identified] : anastrozole 1/2020 to 2/2024 exemestane 2/2024 to present  [de-identified] : Is on exemestane. She has arthritis over the knees: unchanged. Has myalgias. Denies any new breast pain or chest wall changes. No back pain, cough or HA.  Feels very tired since not sleeping enough for years as she has to wake up every 2 hours to urinate and also taking care of 3 grandsons. Able to do all her activities and housework. Mammogram/sonogram done 3/2025 w/o any evidence of malignancy

## 2025-03-04 NOTE — PHYSICAL EXAM
[Fully active, able to carry on all pre-disease performance without restriction] : Status 0 - Fully active, able to carry on all pre-disease performance without restriction [Normal] : affect appropriate [de-identified] : lumpectomy site with mild edema R; no erythema or dimpling of the skin or palpable axillary LN  [de-identified] : strength BLE 5/5

## 2025-03-04 NOTE — PHYSICAL EXAM
[Fully active, able to carry on all pre-disease performance without restriction] : Status 0 - Fully active, able to carry on all pre-disease performance without restriction [Normal] : affect appropriate [de-identified] : lumpectomy site with mild edema R; no erythema or dimpling of the skin or palpable axillary LN  [de-identified] : strength BLE 5/5

## 2025-04-21 NOTE — HISTORY OF PRESENT ILLNESS
[FreeTextEntry1] : Comes in for routine follow-up medical visit. [de-identified] : Status post parathyroidectomy.  Had biopsy of left parotid lesion.  Had bout of vertigo last week which has resolved. Leg cramping has improved.  Edema has resolved.  Denies any orthopnea or PND or calf pain.  Reports normal BMs and urination.  Still complains of nocturia.  Denies any abdominal pain or nausea/vomiting/diarrhea.  Status post colonoscopy.  Denies any chest pain, palpitations, diaphoresis, or shortness of breath.  Denies any cough or hemoptysis.  Denies any fevers or chills.  Overall feeling improved. Very sad today as 3 hours ago her nephew was killed.  He was riding a bike and was hit by an ambulance in [place].

## 2025-04-21 NOTE — HEALTH RISK ASSESSMENT
[Intercurrent hospitalizations] : was admitted to the hospital  [No] : In the past 12 months have you used drugs other than those required for medical reasons? No [No falls in past year] : Patient reported no falls in the past year [Patient refused screening] : Patient refused screening [Reviewed no changes] : Reviewed, no changes [Former] : Former [de-identified] : Head and neck surgery, endocrinology, nephrology, oncology [de-identified] : None [de-identified] : Regular [AdvancecareDate] : 04/25

## 2025-04-21 NOTE — PHYSICAL EXAM
[No Acute Distress] : no acute distress [Well Nourished] : well nourished [Well Developed] : well developed [Well-Appearing] : well-appearing [Normal Voice/Communication] : normal voice/communication [Normal Sclera/Conjunctiva] : normal sclera/conjunctiva [PERRL] : pupils equal round and reactive to light [EOMI] : extraocular movements intact [Normal Outer Ear/Nose] : the outer ears and nose were normal in appearance [Normal Oropharynx] : the oropharynx was normal [No JVD] : no jugular venous distention [Supple] : supple [No Respiratory Distress] : no respiratory distress  [No Accessory Muscle Use] : no accessory muscle use [Clear to Auscultation] : lungs were clear to auscultation bilaterally [Normal Rate] : normal rate  [Regular Rhythm] : with a regular rhythm [Normal S1, S2] : normal S1 and S2 [No Edema] : there was no peripheral edema [No Extremity Clubbing/Cyanosis] : no extremity clubbing/cyanosis [Declined Breast Exam] : declined breast exam  [Soft] : abdomen soft [Normal Bowel Sounds] : normal bowel sounds [Declined Rectal Exam] : declined rectal exam [No CVA Tenderness] : no CVA  tenderness [No Spinal Tenderness] : no spinal tenderness [No Rash] : no rash [No Focal Deficits] : no focal deficits [Normal Gait] : normal gait [Speech Grossly Normal] : speech grossly normal [Normal Affect] : the affect was normal [Alert and Oriented x3] : oriented to person, place, and time [de-identified] : No sinus tenderness; diminished hearing [de-identified] : No stridor [de-identified] : RR = 16; normal air entry without wheezing [de-identified] : Normal bilateral radial pulses; no cords [de-identified] : As per GYN

## 2025-04-21 NOTE — ASSESSMENT
[FreeTextEntry1] : Diabetes Likely with neuropathy Likely with nephropathy Last A1c level = 6%  Saw endocrine Needs diabetes education with home glucose monitoring Needs nutritional counseling Saw podiatry for diabetic footcare = advised to wear specialized shoes Will likely need neurology evaluation given progressive neuropathy To see ophthalmology for diabetic eye exam Exercise/weight control ADA diet  Evidence of CKD Likely related to DM and hypertension Under the care of nephrology Avoid nephrotoxins especially NSAIDs Keep well-hydrated Will closely monitor creatinine and GFR  History of nonalcoholic fatty liver disease Monitor liver ultrasound and FibroScan Monitor LFTs Plans hepatology evaluation = referrals given Weight control Low-fat diet Control lipids  High cholesterol Exercise/weight control Strict low-fat diet reviewed with patient Continue daily atorvastatin  Monitor lipid profile  Hypercalcemia with elevated PTH level = hyperparathyroidism///osteoporosis Status post surgery Closely monitor calcium level Follow-up with Dr. Harley Follow-up with endocrine  Hypertension Blood pressure in better range Continue present regimen as per nephrology Home blood pressure monitoring Low-sodium diet Exercise/weight control Cardiology follow-up  Leg cramping Improved with change of anastrozole to exemestane Oncology follow-up  Needed health maintenance reviewed Can follow through with urology regarding urinary frequency and nocturia She will return in follow-up in 12 weeks and as needed She will call if her status changes or worsens or for any medical issues and return to be seen immediately All of the above was discussed in detail with the patient and all questions were answered She verbally confirmed understanding of all of the above and agreement with the above plan

## 2025-07-07 NOTE — PHYSICAL EXAM
[General Appearance - Alert] : alert [General Appearance - In No Acute Distress] : in no acute distress [Sclera] : the sclera and conjunctiva were normal [PERRL With Normal Accommodation] : pupils were equal in size, round, and reactive to light [Outer Ear] : the ears and nose were normal in appearance [Examination Of The Oral Cavity] : the lips and gums were normal [Oropharynx] : the oropharynx was normal [Neck Appearance] : the appearance of the neck was normal [Jugular Venous Distention Increased] : there was no jugular-venous distention [Respiration, Rhythm And Depth] : normal respiratory rhythm and effort [Exaggerated Use Of Accessory Muscles For Inspiration] : no accessory muscle use [Auscultation Breath Sounds / Voice Sounds] : lungs were clear to auscultation bilaterally [Heart Sounds] : normal S1 and S2 [Murmurs] : no murmurs [Heart Sounds Pericardial Friction Rub] : no pericardial rub [Edema] : there was no peripheral edema [Bowel Sounds] : normal bowel sounds [Abdomen Soft] : soft [Abdomen Tenderness] : non-tender [Abdomen Hernia] : no hernia was discovered [No CVA Tenderness] : no ~M costovertebral angle tenderness [Involuntary Movements] : no involuntary movements were seen [] : no rash [No Focal Deficits] : no focal deficits [Oriented To Time, Place, And Person] : oriented to person, place, and time [Impaired Insight] : insight and judgment were intact [Affect] : the affect was normal [FreeTextEntry1] : obese

## 2025-07-07 NOTE — ASSESSMENT
[FreeTextEntry1] : Ms. Sharpe is a 70-year-old woman with history of diabetes, hypertension, newly diagnosed hyperparathyroidism here for evaluation and management of her chronic kidney disease  CKD: stage 3. Records indicate that her serum creatinine in 2021 and 2022 was ranging between 1.2 to 1.3 mg/dL.  No blood work is available in 2023 this year noted to have a creatinine of 1.6 mg/dL when checked September 2024. This is likely in the setting of longstanding history of diabetes and hypertension. She does have evidence of neuropathy and microalbuminuria. Questionable role of   hyperparathyroidism with hypercalcemia .   Kidney function when checked in April slightly improved at 1.4 mg/dL. Remaining electrolytes in range Reinforced adequate hydration Her use of NSAIDs is very sparse. Reinforced that she take no NSAIDs Repeat urine albumin. If with evidence of albuminuria we will start SGLT2 inhibitors   HTN: Her blood pressure is fair Discussed low-salt diet Maintain on losartan. May need to start SGLT2 pending urine albumin results. This should also help slightly with the blood pressure control no longer on Kerendia Told her to keep a BP log and call me if she develops leg edema and or uncontrolled blood pressure   Hyperparathyroidism: With history of kidney stones (multiple calculi measuring up to 7 mm in the right kidney) S/p parathyroidectomy  F/U in 3 months Addendum: Urine albumin has improved significantly. Hold off on initiation of SGLT2 Increase amlodipine to 5 mg d/w daughter in law       Propranolol Pregnancy And Lactation Text: This medication is Pregnancy Category C and it isn't known if it is safe during pregnancy. It is excreted in breast milk.

## 2025-07-07 NOTE — ASSESSMENT
[FreeTextEntry1] : Ms. Sharpe is a 70-year-old woman with history of diabetes, hypertension, newly diagnosed hyperparathyroidism here for evaluation and management of her chronic kidney disease  CKD: stage 3. Records indicate that her serum creatinine in 2021 and 2022 was ranging between 1.2 to 1.3 mg/dL.  No blood work is available in 2023 this year noted to have a creatinine of 1.6 mg/dL when checked September 2024. This is likely in the setting of longstanding history of diabetes and hypertension. She does have evidence of neuropathy and microalbuminuria. Questionable role of   hyperparathyroidism with hypercalcemia .   Kidney function when checked in April slightly improved at 1.4 mg/dL. Remaining electrolytes in range Reinforced adequate hydration Her use of NSAIDs is very sparse. Reinforced that she take no NSAIDs Repeat urine albumin. If with evidence of albuminuria we will start SGLT2 inhibitors   HTN: Her blood pressure is fair Discussed low-salt diet Maintain on losartan. May need to start SGLT2 pending urine albumin results. This should also help slightly with the blood pressure control no longer on Kerendia Told her to keep a BP log and call me if she develops leg edema and or uncontrolled blood pressure   Hyperparathyroidism: With history of kidney stones (multiple calculi measuring up to 7 mm in the right kidney) S/p parathyroidectomy  F/U in 3 months Addendum: Urine albumin has improved significantly. Hold off on initiation of SGLT2 Increase amlodipine to 5 mg d/w daughter in law

## 2025-07-07 NOTE — HISTORY OF PRESENT ILLNESS
[FreeTextEntry1] : Ms. Sharpe is  a 69 y/o F with Stage I right breast invasive ductal carcinoma, DM, HTN and primary hyperparathyroidism here for evaluation and management of her kidney disease   Diagnosed with stage I right breast invasive ductal carcinoma (T1N0) that is ER positive and Her2 negative s/p lumpectomy and SLNB. She had RT with Dr Go and started on anastrozole 1/2020 to 3/2024 and currently has been on exemestane.   H/O DM: many tears. Well controlled +NEUROPATHY, ?retinopathy  HTN: Many years Occasionally measures BP at home: usually in the 150-160 millimeters mercury Recently started on amlodipine  Hyperparathyroidism:  untreated for many years +osteoporosis, + kidney stones Had a 24 hr urine collection: calcium was not elevated Follows with an endocrinologist , now scheduled for surgery in mid November  Records indicate that her serum creatinine in 2021 and 2022 was ranging between 1.2 to 1.3 mg/dL.  No blood work is available in 2023 this year noted to have a creatinine of 1.6 mg/dL when checked last month.  Also noted to have elevated serum calcium ranging between 10.5 -11 mg/dl  over the last couple of years She complains of body weeks and does take Motrin on an as-needed basis which is very rare probably once a month or so   February 2025 : She comes for a follow-up visit.  She underwent a parathyroidectomy 2 months ago.  Overall feels fair.  States her blood pressure is well-controlled but she can no longer afford Kerendia.  She is requesting change in BP medications k July: Feels fair. Does not measure BP at home The readings of the BP at MD office raging form 130-140 mm Does not take NSAIDS She is unclear about the dose of amlodipine that she is taking. 2.5 mg versus 10 mg

## 2025-07-07 NOTE — HISTORY OF PRESENT ILLNESS
[FreeTextEntry1] : Ms. Sharpe is  a 71 y/o F with Stage I right breast invasive ductal carcinoma, DM, HTN and primary hyperparathyroidism here for evaluation and management of her kidney disease   Diagnosed with stage I right breast invasive ductal carcinoma (T1N0) that is ER positive and Her2 negative s/p lumpectomy and SLNB. She had RT with Dr Go and started on anastrozole 1/2020 to 3/2024 and currently has been on exemestane.   H/O DM: many tears. Well controlled +NEUROPATHY, ?retinopathy  HTN: Many years Occasionally measures BP at home: usually in the 150-160 millimeters mercury Recently started on amlodipine  Hyperparathyroidism:  untreated for many years +osteoporosis, + kidney stones Had a 24 hr urine collection: calcium was not elevated Follows with an endocrinologist , now scheduled for surgery in mid November  Records indicate that her serum creatinine in 2021 and 2022 was ranging between 1.2 to 1.3 mg/dL.  No blood work is available in 2023 this year noted to have a creatinine of 1.6 mg/dL when checked last month.  Also noted to have elevated serum calcium ranging between 10.5 -11 mg/dl  over the last couple of years She complains of body weeks and does take Motrin on an as-needed basis which is very rare probably once a month or so   February 2025 : She comes for a follow-up visit.  She underwent a parathyroidectomy 2 months ago.  Overall feels fair.  States her blood pressure is well-controlled but she can no longer afford Kerendia.  She is requesting change in BP medications k July: Feels fair. Does not measure BP at home The readings of the BP at MD office raging form 130-140 mm Does not take NSAIDS She is unclear about the dose of amlodipine that she is taking. 2.5 mg versus 10 mg

## 2025-07-21 NOTE — HISTORY OF PRESENT ILLNESS
[FreeTextEntry1] : 70 yr old female with past medical history remarkable for CKD stage 3, asthma, HTN, MASLD, breast cancer on anastrozole presenting for follow up of type 2 diabetes mellitus, primary hyperparathyroidism, and osteoporosis.  PSH: Rib resection surgery many years ago in 2014, left superior parathyroidectomy 11/2024  She had tooth removal 4 months ago and she is having an implant placed on August 7th   Regarding hx of breast cancer: - Hx of right invasive duct carcinoma ER - Underwent bilateral lumpectomies 12/2019 and completed adjuvant radiation therapy 4/2020. On anastrozole 1/2020 and then switched to exemestane due to neuropathy in 2/2024  Patient underwent left parathyroidectomy on 11/8/24; she had met criteria for surgery on basis of osteoporosis, renal calculi on renal US, and GFR<60 Pathology showed hypercellular parathyroid- features compatible with adenoma She underwent FNA of parotid nodule, showing simple salivary cyst Postoperative labs:  4/21/25 PTH 82 and Ca 9.4 12/16/24 PTH 83 and Ca 9.6 11/26/24 PTH 81 and Ca 9.1  - She is feeling well - She is mainly eating fruits, vegetables, fish; only having rice once a month and limiting bread - Denies stomach aches, denies constipation - She is still urinating 4-5 times overnight and has disrupted sleep; she usually stops drinking fluids at 8 pm No hx of nephrolithiasis (however renal calculi noted on US) and no hx of fracture +Hx of CKD stage 3 and following with nephrology  Osteoporosis: - BMD from 9/4/24: Spine T score -0.7, Femoral Neck T score -1.6, Total hip T score -0.2, Radius 1/3 T score -2.5 - Family hx of hypercalcemia or osteoporosis: denies - Ca through diet: eats green leafy vegetables through diet, drinks milk  - Vitamin D supplement: 1000 IU daily - Exercise: limited, does household chores and takes care of 3 grandsons  Regarding type 2 diabetes history: Diagnosed: ~2021 A1c 5.7% (7/2025) A1c 6% (12/2024) A1c 6% (9/2024) Current regimen: no meds, diet controlled Glycemic monitoring: not really checking, she has never picked up glucometer (sent at previous visits) Hypoglycemia symptoms/events: denies  Diet: healthy balanced- likes fruits, yogurt Drinks water majority of the day Avoids juice/soda  Family history of DM in father's side Denies personal hx of pancreatitis or family hx of medullary thyroid cancer/MEN  Diabetes complications and comorbidities: Retinopathy- denies, last ophthalmology exam last year, due to see Neuropathy- a lot of cramps in the evening, pins/needles sensation, following with podiatry (Dr. Maren Humphrey) and seen last in April 2025 Nephropathy- yes Last GFR: 38 on 4/21/25 Last Urine microalbumin: elevated UACr of 85 mg/g on 7/3/25, on losartan 100 mg daily; No longer on kerendia (could not afford)  HTN- amlodipine 5 mg and losartan 100 mg daily Coronary artery disease- denies CVA- denies Dyslipidemia- on atorvastatin 40 mg daily Last lipid panel from 4/2025: , chol 170, HDL 46, LDL 86, non-  Social: Tobacco use- denies ETOH- denies

## 2025-07-21 NOTE — ASSESSMENT
[FreeTextEntry1] : 70 yr old female with past medical history remarkable for CKD stage 3, asthma, HTN, MASLD, breast cancer presenting for follow up for type 2 diabetes mellitus, primary hyperparathyroidism now s/p parathyroidectomy (11/2024), and osteoporosis  #Primary hyperparathyroidism s/p parathyroidectomy (11/2024) Postoperative labs:  4/21/25 PTH 82 and Ca 9.4 12/16/24 PTH 83 and Ca 9.6 11/26/24 PTH 81 and Ca 9.1 Following surgery, calcium level has normalized however PTH remains elevated, will continue to monitor Plan: - repeat PTH and Ca level today  #Thyroid nodules Thyroid US from 10-10-24 showed multiple subcentimeter nodules as well as Echo poor nodule identified at lateral aspect lower pole left lobe suggesting possible parathyroid nodule in atypical location. MRI neck on 11-4-24 correlated to left lower pole parathyroid adenoma Plan: - continue to monitor, will plan to repeat thyroid US in 10/2025  #Osteoporosis Likely due to primary hyperparathyroidism; other risk factors include hx of breast cancer w/anastrozole tx, postmenopausal, age BMD from 9/2024 with Spine T score -0.7, Femoral Neck T score -1.6, Total hip T score -0.2, Radius 1/3 T score -2.5 Now s/p parathyroidectomy, generally see improvement in BMD with effect in ~12-18 months after surgery Plan: - Would avoid bisphosphonates due to patient's reduced GFR - Would avoid PTHr analogs due to hx of radiation tx for breast cancer and hyperparathyroidism hx - Can consider starting tx with prolia in the future however will first re-evaluate as generally see improvement in BMD with effect in ~12-18 months after surgery - Plan to repeat BMD in 9/2026 - Optimize ca intake through dietary sources, goal of 1200 mg daily - Optimize vitamin D intake- recommend 1000 IU daily - Recommend daily weight bearing exercise- 20-30 minutes goal of walking daily  #T2DM with hx of moderately increased albuminuria - Well controlled with recent 4/2025 HbA1c 6.3%, off medications -> A1c 5.7% today 7/2025 - Continue lifestyle modifications  BG monitoring: advised patient to check BG 1-2 times per week with glucometer Discussed BG targets below: Target HbA1c <7% Pre-prandial target  mg/dL Post-prandial target <180 mg/dL  Lifestyle modifications: Recommend increasing weekly physical activity with goal 150 minutes/week and dietary modifications- limiting sugary beverages and fried foods, incorporating more fruits/vegetables and whole grains in diet   Monitoring for complications: - Ophthalmology: recommend ophthalmology follow up - Podiatry: following with podiatry - Renal: last urine microalbumin elevated UACr 85 in 7/2025, on losartan 100 mg daily, following with nephrology  #Essential HTN  - Continue amlodipine 5 mg and losartan 100 mg daily  #HLD  Plan: - Continue atorvastatin to 40 mg daily  - Counseled on lifestyle modifications  #Obesity BMI 33 - counseled on lifestyle modifications  RTC in 6 months